# Patient Record
Sex: MALE | Race: WHITE | URBAN - METROPOLITAN AREA
[De-identification: names, ages, dates, MRNs, and addresses within clinical notes are randomized per-mention and may not be internally consistent; named-entity substitution may affect disease eponyms.]

---

## 2017-03-28 ENCOUNTER — EMPLOYEE WELLNESS (OUTPATIENT)
Dept: OTHER | Age: 42
End: 2017-03-28

## 2018-03-20 VITALS — WEIGHT: 147 LBS | BODY MASS INDEX: 20.5 KG/M2

## 2018-03-26 ENCOUNTER — EMPLOYEE WELLNESS (OUTPATIENT)
Dept: OTHER | Age: 43
End: 2018-03-26

## 2018-03-26 LAB
CHOLESTEROL, TOTAL: 165 MG/DL (ref 0–199)
GLUCOSE BLD-MCNC: 85 MG/DL (ref 74–107)
HDLC SERPL-MCNC: 66 MG/DL
LDL CHOLESTEROL CALCULATED: 89 MG/DL (ref 0–99)
TRIGL SERPL-MCNC: 52 MG/DL (ref 0–149)

## 2018-04-02 VITALS — BODY MASS INDEX: 20.21 KG/M2 | WEIGHT: 149 LBS

## 2020-05-01 ENCOUNTER — HOSPITAL ENCOUNTER (OUTPATIENT)
Age: 45
Discharge: HOME | End: 2020-05-03
Payer: COMMERCIAL

## 2020-05-01 PROCEDURE — U0003 INFECTIOUS AGENT DETECTION BY NUCLEIC ACID (DNA OR RNA); SEVERE ACUTE RESPIRATORY SYNDROME CORONAVIRUS 2 (SARS-COV-2) (CORONAVIRUS DISEASE [COVID-19]), AMPLIFIED PROBE TECHNIQUE, MAKING USE OF HIGH THROUGHPUT TECHNOLOGIES AS DESCRIBED BY CMS-2020-01-R: HCPCS

## 2020-05-03 LAB
SARS-COV-2: NOT DETECTED
SOURCE: NORMAL

## 2020-05-07 ENCOUNTER — HOSPITAL ENCOUNTER (OUTPATIENT)
Age: 45
Discharge: HOME | End: 2020-05-09
Payer: COMMERCIAL

## 2020-05-07 PROCEDURE — U0003 INFECTIOUS AGENT DETECTION BY NUCLEIC ACID (DNA OR RNA); SEVERE ACUTE RESPIRATORY SYNDROME CORONAVIRUS 2 (SARS-COV-2) (CORONAVIRUS DISEASE [COVID-19]), AMPLIFIED PROBE TECHNIQUE, MAKING USE OF HIGH THROUGHPUT TECHNOLOGIES AS DESCRIBED BY CMS-2020-01-R: HCPCS

## 2020-05-08 LAB
SARS-COV-2: NOT DETECTED
SOURCE: NORMAL

## 2020-08-06 ENCOUNTER — EMPLOYEE WELLNESS (OUTPATIENT)
Dept: OTHER | Age: 45
End: 2020-08-06

## 2020-08-06 LAB
CHOLESTEROL, TOTAL: 167 MG/DL (ref 0–199)
GLUCOSE BLD-MCNC: 99 MG/DL (ref 74–107)
HDLC SERPL-MCNC: 66 MG/DL
LDL CHOLESTEROL CALCULATED: 90 MG/DL (ref 0–99)
TRIGL SERPL-MCNC: 56 MG/DL (ref 0–149)

## 2020-10-19 VITALS — BODY MASS INDEX: 20.75 KG/M2 | WEIGHT: 153 LBS

## 2021-07-15 LAB
CHOLESTEROL, TOTAL: 174 MG/DL (ref 0–199)
GLUCOSE BLD-MCNC: 96 MG/DL (ref 74–107)
HDLC SERPL-MCNC: 65 MG/DL
LDL CHOLESTEROL CALCULATED: 103 MG/DL (ref 0–99)
TRIGL SERPL-MCNC: 28 MG/DL (ref 0–149)

## 2023-07-28 LAB
CHOLEST SERPL-MCNC: 178 MG/DL
GLUCOSE SERPL-MCNC: 95 MG/DL (ref 74–99)
HDLC SERPL-MCNC: 61 MG/DL
LDLC SERPL CALC-MCNC: 102 MG/DL
PATIENT FASTING?: YES
TRIGL SERPL-MCNC: 74 MG/DL
VLDLC SERPL CALC-MCNC: 15 MG/DL

## 2024-01-11 ENCOUNTER — OFFICE VISIT (OUTPATIENT)
Dept: FAMILY MEDICINE CLINIC | Age: 49
End: 2024-01-11
Payer: COMMERCIAL

## 2024-01-11 VITALS
BODY MASS INDEX: 22.46 KG/M2 | SYSTOLIC BLOOD PRESSURE: 110 MMHG | HEART RATE: 76 BPM | HEIGHT: 72 IN | DIASTOLIC BLOOD PRESSURE: 72 MMHG | TEMPERATURE: 98 F | WEIGHT: 165.8 LBS | OXYGEN SATURATION: 100 %

## 2024-01-11 DIAGNOSIS — J32.9 SINOBRONCHITIS: Primary | ICD-10-CM

## 2024-01-11 DIAGNOSIS — J40 SINOBRONCHITIS: Primary | ICD-10-CM

## 2024-01-11 PROCEDURE — 99213 OFFICE O/P EST LOW 20 MIN: CPT | Performed by: FAMILY MEDICINE

## 2024-01-11 RX ORDER — PREDNISONE 10 MG/1
10 TABLET ORAL DAILY
Qty: 10 TABLET | Refills: 0 | Status: SHIPPED | OUTPATIENT
Start: 2024-01-11 | End: 2024-01-21

## 2024-01-11 RX ORDER — CEFDINIR 300 MG/1
300 CAPSULE ORAL 2 TIMES DAILY
Qty: 20 CAPSULE | Refills: 0 | Status: SHIPPED | OUTPATIENT
Start: 2024-01-11 | End: 2024-01-21

## 2024-01-11 RX ORDER — DOXYCYCLINE HYCLATE 100 MG
TABLET ORAL
COMMUNITY
Start: 2024-01-08 | End: 2024-01-11 | Stop reason: SINTOL

## 2024-01-11 RX ORDER — ALBUTEROL SULFATE 90 UG/1
2 AEROSOL, METERED RESPIRATORY (INHALATION) EVERY 6 HOURS PRN
Qty: 18 G | Refills: 0 | Status: SHIPPED | OUTPATIENT
Start: 2024-01-11

## 2024-01-11 ASSESSMENT — ENCOUNTER SYMPTOMS
SORE THROAT: 0
ALLERGIC/IMMUNOLOGIC NEGATIVE: 1
SINUS PAIN: 0
BACK PAIN: 0
SHORTNESS OF BREATH: 0
EYE DISCHARGE: 0
COUGH: 1
NAUSEA: 0
PHOTOPHOBIA: 0
EYE REDNESS: 0
TROUBLE SWALLOWING: 0
VOMITING: 0
EYE PAIN: 0
ABDOMINAL PAIN: 0
DIARRHEA: 0
BLOOD IN STOOL: 0
SINUS PRESSURE: 1
CHEST TIGHTNESS: 0

## 2024-01-11 NOTE — PROGRESS NOTES
24  Jostin Domínguez : 1975 Sex: male  Age: 48 y.o.      Assessment and Plan:  Jostin was seen today for congestion and cough.    Diagnoses and all orders for this visit:    Sinobronchitis  -     cefdinir (OMNICEF) 300 MG capsule; Take 1 capsule by mouth 2 times daily for 10 days  -     predniSONE (DELTASONE) 10 MG tablet; Take 1 tablet by mouth daily for 10 days  -     albuterol sulfate HFA (PROVENTIL;VENTOLIN;PROAIR) 108 (90 Base) MCG/ACT inhaler; Inhale 2 puffs into the lungs every 6 hours as needed for Wheezing    Will go ahead and treat his sinobronchitis with 10 days of Omnicef and a prednisone burst.  Will also have albuterol metered-dose inhaler 2 puffs 4 times a day as needed.  Symptomatic treatment can include Tylenol, fluids, rest, Mucinex DM, coolmist vaporizer.  If complaints do not improve, or worsen in any way, follow-up with PCP.    Return 3 to 5-day recheck if not improved.    Chief Complaint   Patient presents with    Congestion     Started 6 days ago, negative at home covid test    Cough       Congestion, pressure, drainage, facial tenderness, mild headache, onset 6 days ago.  Serial home COVID test were negative.  His PCP called in doxycycline earlier in the week but he is only had a partial response.  Denies fever, chills, diaphoresis, nausea, vomiting, decreased oral intake. Denies other GI or  complaints.   OTC treatments minimally effective.          Review of Systems   Constitutional:  Negative for appetite change, fatigue and unexpected weight change.   HENT:  Positive for congestion, postnasal drip and sinus pressure. Negative for ear pain, hearing loss, sinus pain, sore throat and trouble swallowing.    Eyes:  Negative for photophobia, pain, discharge and redness.   Respiratory:  Positive for cough. Negative for chest tightness and shortness of breath.    Cardiovascular:  Negative for chest pain, palpitations and leg swelling.   Gastrointestinal:  Negative for abdominal

## 2024-05-28 LAB
CHOLEST SERPL-MCNC: 191 MG/DL
GLUCOSE SERPL-MCNC: 99 MG/DL (ref 74–99)
HDLC SERPL-MCNC: 70 MG/DL
LDLC SERPL CALC-MCNC: 108 MG/DL
PATIENT FASTING?: YES
TRIGL SERPL-MCNC: 65 MG/DL
VLDLC SERPL CALC-MCNC: 13 MG/DL

## 2025-03-26 LAB
CHOLEST SERPL-MCNC: 223 MG/DL
GLUCOSE SERPL-MCNC: 92 MG/DL (ref 74–99)
HDLC SERPL-MCNC: 75 MG/DL
LDLC SERPL CALC-MCNC: 141 MG/DL
PATIENT FASTING?: YES
TRIGL SERPL-MCNC: 36 MG/DL
VLDLC SERPL CALC-MCNC: 7 MG/DL